# Patient Record
(demographics unavailable — no encounter records)

---

## 2025-01-02 NOTE — ASSESSMENT
[FreeTextEntry1] : Vasyl is a 13yo M with hx lactose intolerance, ANA LILIA here for initial evaluation for weight management, referred by PMD   11/2024 recent lab: , A1c 5.6 BMI 33.9/ >99%   POCT UA: protein 30  Plan: - Goal: increase stamina and strength for football tryout in 8/2025 - Recommendations: avoid obesogenic environment and lifestyle modification in dietary, physical activity and behavioral: daily physical activity for 30-60 minutes a day (advance as tolerated), limit access to junk food in the home, family support - Discussed in length health risks in childhood obesity: high blood pressure, high cholesterol, risk factors for cardiovascular disease, increased risk of impaired glucose tolerance, insulin resistance, type 2 diabetes, breathing problems, such as asthma and sleep apnea, joint problems, musculoskeletal discomfort, fatty liver disease, gallstones, GERD/heartburn - Recent labs reviewed with parent - Fasting labs: lipid panel, CMP, TSH, FT4, UA+micro - See nutritionist notes for meal plan and physical activity recommendations - Followup with nutritionist

## 2025-01-02 NOTE — PHYSICAL EXAM
[Normal] : supple and no neck mass was observed [de-identified] : hyperpimgmentation on nape of neck and axilla

## 2025-01-02 NOTE — HISTORY OF PRESENT ILLNESS
[FreeTextEntry1] : Vasyl is a 15yo M here for initial evaluation for weight management, referred by PMD.  Father report weight gain became a concern since 1-2 years ago and contributes weight gain to lack of activity.  Concerns: first meal at 11am, skips breakfast because he gets nauseous  Past/current behavioral strategies: lifting weights daily 30 mins at home gym (treadmill, free weights, rower, maxi- climber) however not recently due to process of moving. Was playing soccer and football after school club in [place].  Denies unhealthy eating behaviors: binge, purge, food restricting, fasting, calorie counting, emotional eating, laxatives, tea, herbs  Current barriers: lactose intolerance, does not like breakfast, does not drink water Hunger/satiety/emotional eating: stops eating when full   Lives with parents, older sister   Currently in [employer] 9th grade, honor student Physical activities: currently no physical activity   Denies exercise intolerance with fatigue and muscle weakness, difficulty breathing requiring frequent rest breaks.  Admits snoring, breath holding/sleep apnea  Bullied/teased/harassed: denies   Family Hx:  Cardiovascular disease - denies  Thyroid disease - PGM thyroid Ca  Obesity/bariatric surgery - denies  Snoring/sleep apnea - parents, sister  HTN - maternal great aunt  Hypercholesterolemia - denies T2D - denies   MH/depression/anxiety/ED - denies

## 2025-01-02 NOTE — REASON FOR VISIT
[Initial Evaluation] : an initial evaluation [Family Member] : family member [Patient] : patient [Mother] : mother [Father] : father [Medical Records] : medical records